# Patient Record
Sex: FEMALE | Race: WHITE | Employment: FULL TIME | URBAN - METROPOLITAN AREA
[De-identification: names, ages, dates, MRNs, and addresses within clinical notes are randomized per-mention and may not be internally consistent; named-entity substitution may affect disease eponyms.]

---

## 2017-01-25 PROBLEM — E04.1 NODULAR THYROID DISEASE: Status: ACTIVE | Noted: 2017-01-25

## 2017-03-02 PROBLEM — E03.9 ACQUIRED HYPOTHYROIDISM: Status: ACTIVE | Noted: 2017-03-02

## 2017-03-15 ENCOUNTER — HOSPITAL ENCOUNTER (OUTPATIENT)
Dept: ULTRASOUND IMAGING | Facility: HOSPITAL | Age: 47
Discharge: HOME | End: 2017-03-15
Attending: INTERNAL MEDICINE
Payer: COMMERCIAL

## 2017-03-15 DIAGNOSIS — E03.9 ACQUIRED HYPOTHYROIDISM: ICD-10-CM

## 2017-03-15 PROCEDURE — 76536 US EXAM OF HEAD AND NECK: CPT | Performed by: RADIOLOGY

## 2017-03-16 NOTE — PROGRESS NOTES
Quick Note:    Patient informed of Dr. Holly Solis result note.  Patient verbalized understanding.     ______

## 2017-03-16 NOTE — PROGRESS NOTES
Quick Note:    [address] regarding Dr. Nick Lobo result note.  Hours and number given.     ______

## 2017-08-01 ENCOUNTER — HOSPITAL ENCOUNTER (OUTPATIENT)
Dept: MAMMOGRAPHY | Facility: HOSPITAL | Age: 47
Discharge: HOME | End: 2017-08-01
Attending: OBSTETRICS & GYNECOLOGY
Payer: COMMERCIAL

## 2017-08-01 DIAGNOSIS — Z12.31 VISIT FOR SCREENING MAMMOGRAM: ICD-10-CM

## 2017-08-01 PROCEDURE — 77067 SCR MAMMO BI INCL CAD: CPT | Performed by: OBSTETRICS & GYNECOLOGY

## 2017-08-01 PROCEDURE — 77063 BREAST TOMOSYNTHESIS BI: CPT | Performed by: OBSTETRICS & GYNECOLOGY

## 2017-11-13 ENCOUNTER — OFFICE VISIT (OUTPATIENT)
Dept: FAMILY MEDICINE CLINIC | Facility: CLINIC | Age: 47
End: 2017-11-13

## 2017-11-13 VITALS
OXYGEN SATURATION: 98 % | BODY MASS INDEX: 20.89 KG/M2 | DIASTOLIC BLOOD PRESSURE: 64 MMHG | HEART RATE: 82 BPM | HEIGHT: 66 IN | SYSTOLIC BLOOD PRESSURE: 112 MMHG | TEMPERATURE: 99 F | RESPIRATION RATE: 18 BRPM | WEIGHT: 130 LBS

## 2017-11-13 DIAGNOSIS — J45.31 MILD PERSISTENT ASTHMA WITH EXACERBATION: ICD-10-CM

## 2017-11-13 DIAGNOSIS — Z01.419 WELL WOMAN EXAM: Primary | ICD-10-CM

## 2017-11-13 PROCEDURE — 99396 PREV VISIT EST AGE 40-64: CPT | Performed by: INTERNAL MEDICINE

## 2017-11-13 RX ORDER — PREDNISONE 20 MG/1
TABLET ORAL
Qty: 8 TABLET | Refills: 0 | Status: SHIPPED | OUTPATIENT
Start: 2017-11-13 | End: 2018-02-01

## 2017-11-13 RX ORDER — ALBUTEROL SULFATE 2.5 MG/3ML
2.5 SOLUTION RESPIRATORY (INHALATION) EVERY 4 HOURS PRN
Qty: 1 BOX | Refills: 1 | Status: SHIPPED | OUTPATIENT
Start: 2017-11-13

## 2017-11-13 NOTE — PROGRESS NOTES
HPI:   Mercedez Lui is a 50year old female who presents for an annual physical. Symptoms: denies discharge, itching, burning or dysuria, periods are regular, regular, sometimes skips months. No LMP recorded.   Previous pap: Julianna, Amelia 51 Past Surgical History:  2007:   2003, 10/2001: COLONOSCOPY  2/4/15: COLONOSCOPY  1991: ORAL SURGERY PROCEDURE   Family History   Problem Relation Age of Onset   • Breast Cancer Mother 70   • Gastro-Intestinal Disorder Mother      ulcers nourished,in no apparent distress  SKIN: no rashes,no suspicious lesions  HEENT: atraumatic, normocephalic; PERRLA, conjunctiva clear; ears, nose and throat are clear  NECK: supple,no adenopathy,no thyromegaly   LUNGS: clear to auscultation, easy breathing

## 2017-11-13 NOTE — PATIENT INSTRUCTIONS
LAB HOURS & LOCATIONS        Gabriela  [facility])    [address] [facility]   [address] [place] [address]     (Building [address])         [address] [place] [address] Ave    Mon-Fri  5am-8pm     Mon-Fri   7am-4pm  Sat         6am-3pm     Sat          7am-3pm      Trenton Barger

## 2018-01-24 ENCOUNTER — TELEPHONE (OUTPATIENT)
Dept: FAMILY MEDICINE CLINIC | Facility: CLINIC | Age: 48
End: 2018-01-24

## 2018-01-24 NOTE — TELEPHONE ENCOUNTER
LMOM for pt that [address] mailed a letter dated 1-8-18 with lab results, review, recommendations. Advised pt we welcome a call back to read the letter to her and go over review and recommendations and letter would be mailed again to pt.  Advised can take up to 10

## 2018-01-31 ENCOUNTER — TELEPHONE (OUTPATIENT)
Dept: FAMILY MEDICINE CLINIC | Facility: CLINIC | Age: 48
End: 2018-01-31

## 2018-01-31 NOTE — TELEPHONE ENCOUNTER
Spoke to patient. Symptoms of stomach ache, cramping, on/off pain, chills, loss of appetite. Denies fever, diarrhea, vomiting. Appointment made.

## 2018-01-31 NOTE — TELEPHONE ENCOUNTER
Pt called to request a call back from the nurse, pt is currently having some stomach issues that she wants to discuss prior to making an appt. Please call pt and advise.

## 2018-02-01 ENCOUNTER — OFFICE VISIT (OUTPATIENT)
Dept: FAMILY MEDICINE CLINIC | Facility: CLINIC | Age: 48
End: 2018-02-01

## 2018-02-01 VITALS
BODY MASS INDEX: 20.09 KG/M2 | RESPIRATION RATE: 20 BRPM | OXYGEN SATURATION: 98 % | WEIGHT: 125 LBS | SYSTOLIC BLOOD PRESSURE: 106 MMHG | HEART RATE: 72 BPM | HEIGHT: 66 IN | TEMPERATURE: 98 F | DIASTOLIC BLOOD PRESSURE: 72 MMHG

## 2018-02-01 DIAGNOSIS — R10.84 GENERALIZED ABDOMINAL PAIN: Primary | ICD-10-CM

## 2018-02-01 PROCEDURE — 99214 OFFICE O/P EST MOD 30 MIN: CPT | Performed by: INTERNAL MEDICINE

## 2018-02-03 NOTE — PROGRESS NOTES
Avril Corona is a 50year old female who presents with abdominal pain. Pain is located at Generalized. Pain is described as cramping. Severity is moderate. Associated symptoms: runny stools, emesis 1 day. The pain radiates to no where.   Has • Lipids Father      hyperlipidemia   • Prostate Cancer Paternal Grandfather    • Dementia Paternal Grandfather    • Thyroid disease Paternal Grandmother    • Diabetes Maternal Grandmother      Type 2   • Hypertension Maternal Grandmother    • Alcohol an verbalized understanding of care.

## 2018-07-03 PROCEDURE — 88175 CYTOPATH C/V AUTO FLUID REDO: CPT | Performed by: OBSTETRICS & GYNECOLOGY

## 2018-07-03 PROCEDURE — 87624 HPV HI-RISK TYP POOLED RSLT: CPT | Performed by: OBSTETRICS & GYNECOLOGY

## 2018-09-26 ENCOUNTER — HOSPITAL ENCOUNTER (OUTPATIENT)
Dept: MAMMOGRAPHY | Facility: HOSPITAL | Age: 48
Discharge: HOME | End: 2018-09-26
Attending: OBSTETRICS & GYNECOLOGY
Payer: COMMERCIAL

## 2018-09-26 DIAGNOSIS — Z01.419 ENCOUNTER FOR GYNECOLOGICAL EXAMINATION WITHOUT ABNORMAL FINDING: ICD-10-CM

## 2018-09-26 PROCEDURE — 77063 BREAST TOMOSYNTHESIS BI: CPT | Performed by: OBSTETRICS & GYNECOLOGY

## 2018-09-26 PROCEDURE — 77067 SCR MAMMO BI INCL CAD: CPT | Performed by: OBSTETRICS & GYNECOLOGY

## 2018-10-09 ENCOUNTER — HOSPITAL ENCOUNTER (OUTPATIENT)
Dept: MAMMOGRAPHY | Facility: HOSPITAL | Age: 48
Discharge: HOME | End: 2018-10-09
Attending: OBSTETRICS & GYNECOLOGY
Payer: COMMERCIAL

## 2018-10-09 DIAGNOSIS — R92.2 INCONCLUSIVE MAMMOGRAM: ICD-10-CM

## 2018-10-09 PROCEDURE — 77065 DX MAMMO INCL CAD UNI: CPT | Performed by: OBSTETRICS & GYNECOLOGY

## 2018-10-09 PROCEDURE — 77061 BREAST TOMOSYNTHESIS UNI: CPT | Performed by: OBSTETRICS & GYNECOLOGY

## 2018-10-09 NOTE — IMAGING NOTE
This Breast Care RN assisted Dr. Amelie Ozuna with recommendation for a left stereotactic biopsy. Procedure reviewed and all questions answered. Emotional and educational support given.   Pt instructed to stop all blood thinners for 3 days before biopsy and 2 da

## 2018-10-10 ENCOUNTER — OFFICE VISIT (OUTPATIENT)
Dept: SURGERY | Facility: CLINIC | Age: 48
End: 2018-10-10
Payer: COMMERCIAL

## 2018-10-10 VITALS
TEMPERATURE: 97 F | HEART RATE: 87 BPM | SYSTOLIC BLOOD PRESSURE: 102 MMHG | BODY MASS INDEX: 19.77 KG/M2 | WEIGHT: 123 LBS | DIASTOLIC BLOOD PRESSURE: 63 MMHG | HEIGHT: 66 IN

## 2018-10-10 DIAGNOSIS — R92.0 MICROCALCIFICATION OF LEFT BREAST ON MAMMOGRAM: Primary | ICD-10-CM

## 2018-10-10 PROCEDURE — 99243 OFF/OP CNSLTJ NEW/EST LOW 30: CPT | Performed by: SURGERY

## 2018-10-10 NOTE — H&P
New Patient Visit Note       Active Problems      1.  Microcalcification of left breast on mammogram        Chief Complaint   Patient presents with:  Abnormal Mammogram: Rec. stereotactic biopsy, never had a biopsy, mother had lumpectomy in 2011 with radiat Relation Age of Onset   • Breast Cancer Mother 70   • Gastro-Intestinal Disorder Mother         ulcers   • Breast Cancer Paternal Aunt 37        estimate   • Prostate Cancer Father    • Hypertension Father    • Lipids Father         hyperlipidemia   • Pros lungs 2 (two) times daily. Disp:  Rfl:    Levonorgestrel-Ethinyl Estrad (ORSYTHIA) 0.1-20 MG-MCG Oral Tab Take 1 tablet by mouth once daily.  Disp: 3 Package Rfl: 3   VENTOLIN  (90 Base) MCG/ACT Inhalation Aero Soln Inhale 2 puffs into the lungs ever well-nourished. No distress. HENT:   Head: Normocephalic and atraumatic. Eyes: Conjunctivae and EOM are normal. Pupils are equal, round, and reactive to light. No scleral icterus. Neck: Trachea normal and full passive range of motion without pain.  Ne Magnification views left breast calcifications demonstrates an indeterminate cluster of calcifications at the 8 o'clock position left breast.  These are amenable to stereotactic biopsy.   Biopsy recommendation was discussed with the patient and   Dr. George Gonzalez

## 2018-10-11 NOTE — PROGRESS NOTES
Future Appointments  10/19/2018 1:00 PM    [facility] BREAST BX           [address]   10/23/2018 8:45 AM    BREAST CLINIC              [address] [facility]  7/8/2019   1:10 PM    Tomasz Sanchez MD      [id number]         [facility]

## 2018-10-18 ENCOUNTER — TELEPHONE (OUTPATIENT)
Dept: SURGERY | Facility: CLINIC | Age: 48
End: 2018-10-18

## 2018-10-18 NOTE — PAT NURSING NOTE
Patient had called PAT earlier today wanting information regarding the stereotactic breast biopsy tomorrow. Procedure will be performed at the [facility] and not in the [facility] [address].   I spoke with Nya Shin in the imaging center and was informed that frnakie

## 2018-10-19 ENCOUNTER — HOSPITAL ENCOUNTER (OUTPATIENT)
Dept: MAMMOGRAPHY | Facility: HOSPITAL | Age: 48
Discharge: HOME | End: 2018-10-19
Attending: SURGERY
Payer: COMMERCIAL

## 2018-10-19 DIAGNOSIS — R92.1 BREAST CALCIFICATIONS: ICD-10-CM

## 2018-10-19 PROCEDURE — 19081 BX BREAST 1ST LESION STRTCTC: CPT | Performed by: SURGERY

## 2018-10-19 PROCEDURE — 88305 TISSUE EXAM BY PATHOLOGIST: CPT | Performed by: SURGERY

## 2018-10-19 NOTE — OPERATIVE REPORT
[facility]    PATIENT'S NAME: Lynne Feliz   ATTENDING PHYSICIAN: Kirsten Montano M.D. OPERATING PHYSICIAN: Kirsten Montano M.D.    PATIENT ACCOUNT#:   [de-identified]    LOCATION:  [facility]  MEDICAL RECORD #:   [id number]       DATE Beryl Kincaid withdrawn. Pressure and dressings were applied per the mammography staff. The patient tolerated the procedure well and was discharged in good condition.     Dictated By Scooby Harris M.D.  d: 10/19/2018 17:11:02  t: 10/19/2018 18:21:21  Job [id number]

## 2018-10-23 ENCOUNTER — OFFICE VISIT (OUTPATIENT)
Dept: SURGERY | Facility: CLINIC | Age: 48
End: 2018-10-23

## 2018-10-23 ENCOUNTER — OFFICE VISIT (OUTPATIENT)
Dept: HEMATOLOGY/ONCOLOGY | Facility: HOSPITAL | Age: 48
End: 2018-10-23
Attending: SURGERY
Payer: COMMERCIAL

## 2018-10-23 VITALS
BODY MASS INDEX: 20.17 KG/M2 | DIASTOLIC BLOOD PRESSURE: 55 MMHG | SYSTOLIC BLOOD PRESSURE: 103 MMHG | RESPIRATION RATE: 18 BRPM | WEIGHT: 125.5 LBS | OXYGEN SATURATION: 97 % | HEIGHT: 65.98 IN | HEART RATE: 77 BPM | TEMPERATURE: 98 F

## 2018-10-23 DIAGNOSIS — N60.12 FIBROCYSTIC CHANGES OF LEFT BREAST: Primary | ICD-10-CM

## 2018-10-23 DIAGNOSIS — N60.12 FIBROCYSTIC CHANGES OF LEFT BREAST: ICD-10-CM

## 2018-10-23 PROCEDURE — 99211 OFF/OP EST MAY X REQ PHY/QHP: CPT

## 2018-10-23 PROCEDURE — 99212 OFFICE O/P EST SF 10 MIN: CPT | Performed by: SURGERY

## 2018-10-23 RX ORDER — BUDESONIDE AND FORMOTEROL FUMARATE DIHYDRATE 160; 4.5 UG/1; UG/1
2 AEROSOL RESPIRATORY (INHALATION) 2 TIMES DAILY
COMMUNITY

## 2018-10-23 NOTE — PROGRESS NOTES
HPI:    Patient ID: Mirza Cash is a 46year old female who follows up after left breast stereotactic biopsy, performed October 19, 2018. She has no breast complaints. The biopsy went quite well.     HPI    Review of Systems           Current Pulmonary/Chest: She exhibits no mass. Left breast exhibits no inverted nipple, no mass, no nipple discharge, no skin change and no tenderness. Left breast biopsy site healing well   Neurological: She is alert and oriented to person, place, and time.

## 2018-10-23 NOTE — PROGRESS NOTES
Patient is here today for follow up  with Dr. Adelaida Topete / Rajwinder Mir. Patient Denies pain. Medication list and medical history were reviewed and updated.     Education Record    Learner:  Patient    Disease / Diagnosis: Breast Clinic / Dr. Gabino Mendes

## 2018-10-29 PROBLEM — N60.12 FIBROCYSTIC CHANGES OF LEFT BREAST: Status: ACTIVE | Noted: 2018-10-29

## 2019-09-16 ENCOUNTER — OFFICE VISIT (OUTPATIENT)
Dept: SURGERY | Facility: CLINIC | Age: 49
End: 2019-09-16
Payer: COMMERCIAL

## 2019-09-16 VITALS
HEART RATE: 91 BPM | BODY MASS INDEX: 20 KG/M2 | SYSTOLIC BLOOD PRESSURE: 115 MMHG | WEIGHT: 125 LBS | TEMPERATURE: 99 F | DIASTOLIC BLOOD PRESSURE: 62 MMHG

## 2019-09-16 DIAGNOSIS — K92.2 INTESTINAL BLEEDING: ICD-10-CM

## 2019-09-16 DIAGNOSIS — Z12.11 ENCOUNTER FOR SCREENING COLONOSCOPY: Primary | ICD-10-CM

## 2019-09-16 PROCEDURE — 99213 OFFICE O/P EST LOW 20 MIN: CPT | Performed by: SURGERY

## 2019-09-16 RX ORDER — LEVOTHYROXINE SODIUM 0.12 MG/1
125 TABLET ORAL
Refills: 2 | COMMUNITY
Start: 2019-06-23

## 2019-09-16 RX ORDER — POLYETHYLENE GLYCOL 3350, SODIUM CHLORIDE, SODIUM BICARBONATE, POTASSIUM CHLORIDE 420; 11.2; 5.72; 1.48 G/4L; G/4L; G/4L; G/4L
POWDER, FOR SOLUTION ORAL
Qty: 1 BOTTLE | Refills: 0 | Status: SHIPPED | OUTPATIENT
Start: 2019-09-16

## 2019-09-16 NOTE — H&P
New Patient Visit Note       Active Problems      1. Encounter for screening colonoscopy    2.  Intestinal bleeding        Chief Complaint   Patient presents with:  Colonoscopy: NP no family hx of colon CA      History of Present Illness   This patient pres Procedure Laterality Date   •   2007   • COLONOSCOPY  2003, 10/2001   • COLONOSCOPY  2/4/15   • COLONOSCOPY, POSSIBLE BIOPSY, POSSIBLE POLYPECTOMY [address]681 N/A 10/11/2019    Performed by Aracelis Chambers MD at [place] [address] Breath Activated, Inhale 1 puff into the lungs 2 (two) times daily. , Disp: , Rfl:   •  VENTOLIN  (90 Base) MCG/ACT Inhalation Aero Soln, Inhale 2 puffs into the lungs every 4 (four) hours as needed for Wheezing., Disp: 1 Inhaler, Rfl: 1  •  albutero kg)   BMI 20.19 kg/m²   Physical Exam   Constitutional: She is oriented to person, place, and time. She appears well-developed and well-nourished. No distress. HENT:   Head: Normocephalic and atraumatic.    Eyes: Conjunctivae and EOM are normal. No sclera

## 2019-10-25 ENCOUNTER — PATIENT OUTREACH (OUTPATIENT)
Dept: SURGERY | Facility: CLINIC | Age: 49
End: 2019-10-25

## 2020-07-01 ENCOUNTER — TELEPHONE (OUTPATIENT)
Dept: SURGERY | Facility: CLINIC | Age: 50
End: 2020-07-01

## 2020-07-01 NOTE — TELEPHONE ENCOUNTER
Called pt to notify Suly reviewed KWABENA from [address] and came back normal. Suly ask she have a breast exam in office or if another physician does them, that if fine as well. Pt states she has an OBGYN appt coming up and will have it completed then.

## 2020-07-08 ENCOUNTER — MED REC SCAN ONLY (OUTPATIENT)
Dept: SURGERY | Facility: CLINIC | Age: 50
End: 2020-07-08

## 2021-04-27 ENCOUNTER — OFFICE VISIT (OUTPATIENT)
Dept: FAMILY MEDICINE CLINIC | Facility: CLINIC | Age: 51
End: 2021-04-27
Payer: COMMERCIAL

## 2021-04-27 VITALS
HEART RATE: 86 BPM | BODY MASS INDEX: 20.25 KG/M2 | TEMPERATURE: 97 F | SYSTOLIC BLOOD PRESSURE: 98 MMHG | WEIGHT: 126 LBS | RESPIRATION RATE: 18 BRPM | DIASTOLIC BLOOD PRESSURE: 62 MMHG | HEIGHT: 66 IN

## 2021-04-27 DIAGNOSIS — E06.3 HYPOTHYROIDISM DUE TO HASHIMOTO'S THYROIDITIS: ICD-10-CM

## 2021-04-27 DIAGNOSIS — Z00.00 ROUTINE GENERAL MEDICAL EXAMINATION AT A HEALTH CARE FACILITY: Primary | ICD-10-CM

## 2021-04-27 DIAGNOSIS — E03.8 HYPOTHYROIDISM DUE TO HASHIMOTO'S THYROIDITIS: ICD-10-CM

## 2021-04-27 DIAGNOSIS — J45.40 MODERATE PERSISTENT ASTHMA WITHOUT COMPLICATION: ICD-10-CM

## 2021-04-27 PROBLEM — E04.1 NODULAR THYROID DISEASE: Status: RESOLVED | Noted: 2017-01-25 | Resolved: 2021-04-27

## 2021-04-27 PROBLEM — E03.9 ACQUIRED HYPOTHYROIDISM: Status: RESOLVED | Noted: 2017-03-02 | Resolved: 2021-04-27

## 2021-04-27 PROBLEM — K92.2 INTESTINAL BLEEDING: Status: RESOLVED | Noted: 2019-09-16 | Resolved: 2021-04-27

## 2021-04-27 PROCEDURE — 3078F DIAST BP <80 MM HG: CPT | Performed by: FAMILY MEDICINE

## 2021-04-27 PROCEDURE — 99386 PREV VISIT NEW AGE 40-64: CPT | Performed by: FAMILY MEDICINE

## 2021-04-27 PROCEDURE — 3074F SYST BP LT 130 MM HG: CPT | Performed by: FAMILY MEDICINE

## 2021-04-27 PROCEDURE — 3008F BODY MASS INDEX DOCD: CPT | Performed by: FAMILY MEDICINE

## 2021-04-27 NOTE — PROGRESS NOTES
HPI:   Kiera Feliz is a 44year old female who presents for a complete physical exam.  Last pap:  4/2021 - sees Dr. Olayinka Ackerman  Last mammogram: 7/2020    On OCPs per GYN  Previous colonoscopy:  10/2019 - repeat in 10 years  Previous DEXA:  /.  Bhavani Anderson Aerosol Inhale 2 puffs into the lungs 2 (two) times daily. • fluticasone-salmeterol 100-50 MCG/DOSE Inhalation Aerosol Powder, Breath Activated Inhale 1 puff into the lungs 2 (two) times daily.      • VENTOLIN  (90 Base) MCG/ACT Inhalation Aero S Grandmother         Type 2   • Hypertension Maternal Grandmother    • Alcohol and Other Disorders Associated Maternal Grandfather         alcohol abuse   • Cancer Maternal Aunt         cervical      Social History:   Social History    Tobacco Use      Smok Visit:  Requested Prescriptions      No prescriptions requested or ordered in this encounter       Imaging & Consults:  PULMONARY - INTERNAL

## 2021-09-01 LAB
ALBUMIN/GLOBULIN RATIO: 1.4 (CALC) (ref 1–2.5)
ALBUMIN: 4 G/DL (ref 3.6–5.1)
ALKALINE PHOSPHATASE: 29 U/L (ref 37–153)
ALT: 13 U/L (ref 6–29)
AST: 13 U/L (ref 10–35)
BILIRUBIN, TOTAL: 1.5 MG/DL (ref 0.2–1.2)
BUN: 10 MG/DL (ref 7–25)
CALCIUM: 9.3 MG/DL (ref 8.6–10.4)
CARBON DIOXIDE: 26 MMOL/L (ref 20–32)
CHLORIDE: 103 MMOL/L (ref 98–110)
CHOL/HDLC RATIO: 4 (CALC)
CHOLESTEROL, TOTAL: 211 MG/DL
CREATININE: 0.58 MG/DL (ref 0.5–1.05)
EGFR IF AFRICN AM: 123 ML/MIN/1.73M2
EGFR IF NONAFRICN AM: 106 ML/MIN/1.73M2
GLOBULIN: 2.9 G/DL (CALC) (ref 1.9–3.7)
GLUCOSE: 81 MG/DL (ref 65–99)
HDL CHOLESTEROL: 53 MG/DL
HEMATOCRIT: 36.1 % (ref 35–45)
HEMOGLOBIN: 12.2 G/DL (ref 11.7–15.5)
LDL-CHOLESTEROL: 143 MG/DL (CALC)
MCH: 30 PG (ref 27–33)
MCHC: 33.8 G/DL (ref 32–36)
MCV: 88.9 FL (ref 80–100)
MPV: 10.4 FL (ref 7.5–12.5)
NON-HDL CHOLESTEROL: 158 MG/DL (CALC)
PLATELET COUNT: 269 THOUSAND/UL (ref 140–400)
POTASSIUM: 4.4 MMOL/L (ref 3.5–5.3)
PROTEIN, TOTAL: 6.9 G/DL (ref 6.1–8.1)
RDW: 11.8 % (ref 11–15)
RED BLOOD CELL COUNT: 4.06 MILLION/UL (ref 3.8–5.1)
SODIUM: 137 MMOL/L (ref 135–146)
T4, FREE: 1.4 NG/DL (ref 0.8–1.8)
TRIGLYCERIDES: 60 MG/DL
TSH: 1.21 MIU/L
VITAMIN D, 25-OH, TOTAL: 27 NG/ML (ref 30–100)
WHITE BLOOD CELL COUNT: 4.7 THOUSAND/UL (ref 3.8–10.8)

## 2024-12-11 ENCOUNTER — LAB ENCOUNTER (OUTPATIENT)
Dept: LAB | Facility: HOSPITAL | Age: 54
End: 2024-12-11
Attending: STUDENT IN AN ORGANIZED HEALTH CARE EDUCATION/TRAINING PROGRAM
Payer: COMMERCIAL

## 2024-12-11 DIAGNOSIS — Z01.818 PRE-OP TESTING: ICD-10-CM

## 2024-12-11 LAB
ERYTHROCYTE [DISTWIDTH] IN BLOOD BY AUTOMATED COUNT: 11.6 %
HCT VFR BLD AUTO: 38.2 %
HGB BLD-MCNC: 12.7 G/DL
MCH RBC QN AUTO: 29.8 PG (ref 26–34)
MCHC RBC AUTO-ENTMCNC: 33.2 G/DL (ref 31–37)
MCV RBC AUTO: 89.7 FL
PLATELET # BLD AUTO: 251 10(3)UL (ref 150–450)
RBC # BLD AUTO: 4.26 X10(6)UL
WBC # BLD AUTO: 5.4 X10(3) UL (ref 4–11)

## 2024-12-11 PROCEDURE — 85027 COMPLETE CBC AUTOMATED: CPT

## 2024-12-11 PROCEDURE — 36415 COLL VENOUS BLD VENIPUNCTURE: CPT

## 2024-12-12 ENCOUNTER — ANESTHESIA EVENT (OUTPATIENT)
Dept: SURGERY | Facility: HOSPITAL | Age: 54
End: 2024-12-12
Payer: COMMERCIAL

## 2024-12-13 ENCOUNTER — HOSPITAL ENCOUNTER (OUTPATIENT)
Facility: HOSPITAL | Age: 54
Setting detail: HOSPITAL OUTPATIENT SURGERY
Discharge: HOME | End: 2024-12-13
Attending: STUDENT IN AN ORGANIZED HEALTH CARE EDUCATION/TRAINING PROGRAM | Admitting: STUDENT IN AN ORGANIZED HEALTH CARE EDUCATION/TRAINING PROGRAM
Payer: COMMERCIAL

## 2024-12-13 ENCOUNTER — ANESTHESIA (OUTPATIENT)
Dept: SURGERY | Facility: HOSPITAL | Age: 54
End: 2024-12-13
Payer: COMMERCIAL

## 2024-12-13 VITALS
HEART RATE: 73 BPM | WEIGHT: 126.63 LBS | TEMPERATURE: 98 F | DIASTOLIC BLOOD PRESSURE: 56 MMHG | OXYGEN SATURATION: 99 % | SYSTOLIC BLOOD PRESSURE: 96 MMHG | RESPIRATION RATE: 16 BRPM | BODY MASS INDEX: 20.35 KG/M2 | HEIGHT: 66 IN

## 2024-12-13 DIAGNOSIS — Z01.818 PRE-OP TESTING: Primary | ICD-10-CM

## 2024-12-13 PROCEDURE — 0UB98ZZ EXCISION OF UTERUS, VIA NATURAL OR ARTIFICIAL OPENING ENDOSCOPIC: ICD-10-PCS | Performed by: STUDENT IN AN ORGANIZED HEALTH CARE EDUCATION/TRAINING PROGRAM

## 2024-12-13 PROCEDURE — 88305 TISSUE EXAM BY PATHOLOGIST: CPT | Performed by: STUDENT IN AN ORGANIZED HEALTH CARE EDUCATION/TRAINING PROGRAM

## 2024-12-13 RX ORDER — ONDANSETRON 2 MG/ML
4 INJECTION INTRAMUSCULAR; INTRAVENOUS EVERY 6 HOURS PRN
Status: DISCONTINUED | OUTPATIENT
Start: 2024-12-13 | End: 2024-12-13

## 2024-12-13 RX ORDER — IBUPROFEN 600 MG/1
600 TABLET, FILM COATED ORAL EVERY 6 HOURS PRN
Qty: 20 TABLET | Refills: 0 | Status: SHIPPED | OUTPATIENT
Start: 2024-12-13

## 2024-12-13 RX ORDER — SODIUM CHLORIDE, SODIUM LACTATE, POTASSIUM CHLORIDE, CALCIUM CHLORIDE 600; 310; 30; 20 MG/100ML; MG/100ML; MG/100ML; MG/100ML
INJECTION, SOLUTION INTRAVENOUS CONTINUOUS
Status: DISCONTINUED | OUTPATIENT
Start: 2024-12-13 | End: 2024-12-13

## 2024-12-13 RX ORDER — SCOLOPAMINE TRANSDERMAL SYSTEM 1 MG/1
1 PATCH, EXTENDED RELEASE TRANSDERMAL ONCE
Status: DISCONTINUED | OUTPATIENT
Start: 2024-12-13 | End: 2024-12-13 | Stop reason: HOSPADM

## 2024-12-13 RX ORDER — DEXAMETHASONE SODIUM PHOSPHATE 4 MG/ML
VIAL (ML) INJECTION AS NEEDED
Status: DISCONTINUED | OUTPATIENT
Start: 2024-12-13 | End: 2024-12-13 | Stop reason: SURG

## 2024-12-13 RX ORDER — MIDAZOLAM HYDROCHLORIDE 1 MG/ML
INJECTION INTRAMUSCULAR; INTRAVENOUS AS NEEDED
Status: DISCONTINUED | OUTPATIENT
Start: 2024-12-13 | End: 2024-12-13 | Stop reason: SURG

## 2024-12-13 RX ORDER — NALOXONE HYDROCHLORIDE 0.4 MG/ML
0.08 INJECTION, SOLUTION INTRAMUSCULAR; INTRAVENOUS; SUBCUTANEOUS AS NEEDED
Status: DISCONTINUED | OUTPATIENT
Start: 2024-12-13 | End: 2024-12-13

## 2024-12-13 RX ORDER — HYDROMORPHONE HYDROCHLORIDE 1 MG/ML
0.4 INJECTION, SOLUTION INTRAMUSCULAR; INTRAVENOUS; SUBCUTANEOUS EVERY 5 MIN PRN
Status: DISCONTINUED | OUTPATIENT
Start: 2024-12-13 | End: 2024-12-13

## 2024-12-13 RX ORDER — HYDROCODONE BITARTRATE AND ACETAMINOPHEN 5; 325 MG/1; MG/1
1 TABLET ORAL ONCE AS NEEDED
Status: DISCONTINUED | OUTPATIENT
Start: 2024-12-13 | End: 2024-12-13

## 2024-12-13 RX ORDER — ONDANSETRON 2 MG/ML
INJECTION INTRAMUSCULAR; INTRAVENOUS AS NEEDED
Status: DISCONTINUED | OUTPATIENT
Start: 2024-12-13 | End: 2024-12-13 | Stop reason: SURG

## 2024-12-13 RX ORDER — HYDROMORPHONE HYDROCHLORIDE 1 MG/ML
0.6 INJECTION, SOLUTION INTRAMUSCULAR; INTRAVENOUS; SUBCUTANEOUS EVERY 5 MIN PRN
Status: DISCONTINUED | OUTPATIENT
Start: 2024-12-13 | End: 2024-12-13

## 2024-12-13 RX ORDER — DIPHENHYDRAMINE HYDROCHLORIDE 50 MG/ML
12.5 INJECTION INTRAMUSCULAR; INTRAVENOUS AS NEEDED
Status: DISCONTINUED | OUTPATIENT
Start: 2024-12-13 | End: 2024-12-13

## 2024-12-13 RX ORDER — KETOROLAC TROMETHAMINE 30 MG/ML
INJECTION, SOLUTION INTRAMUSCULAR; INTRAVENOUS AS NEEDED
Status: DISCONTINUED | OUTPATIENT
Start: 2024-12-13 | End: 2024-12-13 | Stop reason: SURG

## 2024-12-13 RX ORDER — LIDOCAINE HYDROCHLORIDE 10 MG/ML
INJECTION, SOLUTION EPIDURAL; INFILTRATION; INTRACAUDAL; PERINEURAL AS NEEDED
Status: DISCONTINUED | OUTPATIENT
Start: 2024-12-13 | End: 2024-12-13 | Stop reason: SURG

## 2024-12-13 RX ORDER — HYDROCODONE BITARTRATE AND ACETAMINOPHEN 5; 325 MG/1; MG/1
2 TABLET ORAL ONCE AS NEEDED
Status: DISCONTINUED | OUTPATIENT
Start: 2024-12-13 | End: 2024-12-13

## 2024-12-13 RX ORDER — PROCHLORPERAZINE EDISYLATE 5 MG/ML
5 INJECTION INTRAMUSCULAR; INTRAVENOUS EVERY 8 HOURS PRN
Status: DISCONTINUED | OUTPATIENT
Start: 2024-12-13 | End: 2024-12-13

## 2024-12-13 RX ORDER — ACETAMINOPHEN 500 MG
1000 TABLET ORAL ONCE AS NEEDED
Status: DISCONTINUED | OUTPATIENT
Start: 2024-12-13 | End: 2024-12-13

## 2024-12-13 RX ORDER — ACETAMINOPHEN 500 MG
1000 TABLET ORAL ONCE
Status: DISCONTINUED | OUTPATIENT
Start: 2024-12-13 | End: 2024-12-13 | Stop reason: HOSPADM

## 2024-12-13 RX ORDER — HYDROMORPHONE HYDROCHLORIDE 1 MG/ML
0.2 INJECTION, SOLUTION INTRAMUSCULAR; INTRAVENOUS; SUBCUTANEOUS EVERY 5 MIN PRN
Status: DISCONTINUED | OUTPATIENT
Start: 2024-12-13 | End: 2024-12-13

## 2024-12-13 RX ADMIN — LIDOCAINE HYDROCHLORIDE 50 MG: 10 INJECTION, SOLUTION EPIDURAL; INFILTRATION; INTRACAUDAL; PERINEURAL at 07:39:00

## 2024-12-13 RX ADMIN — MIDAZOLAM HYDROCHLORIDE 2 MG: 1 INJECTION INTRAMUSCULAR; INTRAVENOUS at 07:38:00

## 2024-12-13 RX ADMIN — ONDANSETRON 4 MG: 2 INJECTION INTRAMUSCULAR; INTRAVENOUS at 07:47:00

## 2024-12-13 RX ADMIN — DEXAMETHASONE SODIUM PHOSPHATE 4 MG: 4 MG/ML VIAL (ML) INJECTION at 07:47:00

## 2024-12-13 RX ADMIN — KETOROLAC TROMETHAMINE 30 MG: 30 INJECTION, SOLUTION INTRAMUSCULAR; INTRAVENOUS at 07:55:00

## 2024-12-13 RX ADMIN — SODIUM CHLORIDE, SODIUM LACTATE, POTASSIUM CHLORIDE, CALCIUM CHLORIDE: 600; 310; 30; 20 INJECTION, SOLUTION INTRAVENOUS at 07:38:00

## 2024-12-13 NOTE — OPERATIVE REPORT
[facility]    Vane Oliveira Patient Status:  Hospital Outpatient Surgery    1970 MRN [id number]   Location [facility] SURGERY Attending Lynn Mckeon MD   Hosp Day # 0 PCP SHERLEY Iyer     Date of procedure: 2024    Preoperative diagnosis:  Postmenopausal bleeding  Suspected submucosal fibroid    Postoperative diagnosis:  same    Procedure:  Hysteroscopic myomectomy with Truclear, and D&C    Surgeon: Dr.Darlene Mckeon    Anesthesia: MAC  EBL: 5 cc  Urine output: 150cc  Fluids: see EMR  Antibiotics: none indicated  DVT ppx: SCDs  Fluid defecit: 150 cc    Findings:  1) submucosal fibroid with otherwise hysteroscopically normal atrophic endometrium    Complications: none    Specimens: fibroid and endometrial curettings    Indication for procedure: This is a 55 yo with postmenopausal bleeding found to have a suspected submucosal fibroid on ultrasound. The risks were reviewed with her, and she gave informed consent.    Procedure: The patient was taken to the operating room and placed under anesthesia. She was prepped and draped in sterile fashion in lithotomy position. Her bladder was drained. A speculum was placed into the vagina, and her cervix was grasped with a single tooth tenaculum. The uterus was sounded as above. The cervix was then dilated to a 7mm Hegar dilator.     The 6 mm 0 degree hysteroscope was inserted, with findings as noted above. The soft tissue mini truclear morcellator was then used to remove fibroid and sample cavity. The endometrium was thin and without lesions after morcellation. The hysteroscope was removed. Sharp currette was performed.     The tenaculum and speculum were removed. Pressure was applied to the cervix, and it was noted to be hemostatic.    She was awoken from anesthesia and taken to the recovery room in good condition. She tolerated the procedure well.

## 2024-12-13 NOTE — DISCHARGE INSTRUCTIONS
For the next two weeks:  -Nothing in the vagina (no sex, no tampons)  -take OTC ibuprofen/tylenol as needed for pain  -Call the office for vaginal bleeding more than a period or bleeding that soaks more than one pad per hour  -Call the office for fever >100.5  -Call the office for pain not relieved by pain medication

## 2024-12-13 NOTE — ANESTHESIA PREPROCEDURE EVALUATION
PRE-OP EVALUATION    Patient Name: Vane Oliveira    Admit Diagnosis: SUBMUCOSAL FIBROID    Pre-op Diagnosis: SUBMUCOSAL FIBROID    HYSTEROSCOPY MYOMECTOMY, DILATION AND CURETTAGE    Anesthesia Procedure: HYSTEROSCOPY MYOMECTOMY, DILATION AND CURETTAGE (Vagina )    Surgeons and Role:     * Lynn Mckeon MD - Primary    Pre-op vitals reviewed.  Temp: 97.3 °F (36.3 °C)  Pulse: 83  Resp: 18  BP: 115/63  SpO2: 98 %  Body mass index is 20.43 kg/m².    Current medications reviewed.  Hospital Medications:  • acetaminophen (Tylenol Extra Strength) tab 1,000 mg  1,000 mg Oral Once   • scopolamine (Transderm-Scop) 1 MG/3DAYS patch 1 patch  1 patch Transdermal Once   • lactated ringers infusion   Intravenous Continuous       Outpatient Medications:   Prescriptions Prior to Admission[1]    Allergies: Seasonal      Anesthesia Evaluation    Patient summary reviewed.    Anesthetic Complications           GI/Hepatic/Renal                                 Cardiovascular                     (+) hyperlipidemia                                  Endo/Other  Comment: Submucus fibroid for hysteroscopy D&C         (+) hypothyroidism                       Pulmonary      (+) asthma                     Neuro/Psych                                    Past Surgical History:   Procedure Laterality Date   •   2007   • Colonoscopy  2003, 10/2001   • Colonoscopy  2/4/15   • Colonoscopy N/A 10/11/2019    Procedure: COLONOSCOPY, POSSIBLE BIOPSY, POSSIBLE POLYPECTOMY 41514;  Surgeon: Agustina Murray MD;  [facility]   • D & c  2021    Hysteroscopy   • Hysteroscopy  2021    TruClear HSC, D&C   • Oral surgery procedure  1991     Social History     Socioeconomic History   • Marital status:    Tobacco Use   • Smoking status: Never   • Smokeless tobacco: Never   Vaping Use   • Vaping status: Never Used   Substance and Sexual Activity   • Alcohol use: Yes     Comment: rare   • Drug use: No    Other Topics Concern   • Caffeine Concern Yes     Comment: pop 2 a month and 1 sweet tea    • Exercise Yes     Comment: workouts at home    • Seat Belt Yes   • Self-Exams Yes     History   Drug Use No     Available pre-op labs reviewed.  Lab Results   Component Value Date    WBC 5.4 12/11/2024    RBC 4.26 12/11/2024    HGB 12.7 12/11/2024    HCT 38.2 12/11/2024    MCV 89.7 12/11/2024    MCH 29.8 12/11/2024    MCHC 33.2 12/11/2024    RDW 11.6 12/11/2024    .0 12/11/2024               Airway      Mallampati: II  Mouth opening: >3 FB  TM distance: > 6 cm  Neck ROM: full Cardiovascular    Cardiovascular exam normal.  Rhythm: regular  Rate: normal     Dental    Dentition appears grossly intact         Pulmonary    Pulmonary exam normal.  Breath sounds clear to auscultation bilaterally.               Other findings          ASA: 2   Plan: MAC  NPO status verified and patient meets guidelines.        Comment: Plan for MAC. A detailed discussion of the risks and benefits of the proposed anesthetic was had in the preoperative area, including risk of conversion to a general anesthetic, nausea/vomiting, dental damage, sore throat and allergic/ adverse reactions to medications administered. The possibility of needing to convert to general anesthesia if necessary was discussed. Questions answered and patient wishes to proceed. Consent signed.     Plan/risks discussed with: patient and spouse            Present on Admission:  **None**             [1]   Medications Prior to Admission   Medication Sig Dispense Refill Last Dose/Taking   • levothyroxine 125 MCG Oral Tab Take 1 tablet (125 mcg total) by mouth once daily. 90 tablet 1 12/13/2024 Morning   • Budesonide-Formoterol Fumarate 160-4.5 MCG/ACT Inhalation Aerosol Inhale 2 puffs into the lungs 2 (two) times daily.   12/12/2024 Noon   • fluticasone-salmeterol 100-50 MCG/DOSE Inhalation Aerosol Powder, Breath Activated Inhale 1 puff into the lungs 2 (two) times daily.    12/12/2024 Noon   • VENTOLIN  (90 Base) MCG/ACT Inhalation Aero Soln Inhale 2 puffs into the lungs every 4 (four) hours as needed for Wheezing. 1 Inhaler 1 Taking As Needed   • albuterol sulfate (2.5 MG/3ML) 0.083% Inhalation Nebu Soln Take 3 mL (2.5 mg total) by nebulization every 4 (four) hours as needed for Wheezing. 1 Box 1 12/12/2024 Noon   • Montelukast Sodium (SINGULAIR) 10 MG Oral Tab   1 12/12/2024 Evening   • Multiple Vitamins-Minerals (MULTIVITAMIN OR) Take  by mouth.   Past Week   • Ascorbic Acid (VITAMIN C OR) Take  by mouth.   Past Week   • CALCIUM-VITAMIN D OR    Past Week   • PEG 3350-KCl-Na Bicarb-NaCl (TRILYTE) 420 g Oral Recon Soln Starting at 4:00 pm the night before procedure, drink 8 ounces of the prep every 15-20 minutes until finished 1 Bottle 0

## 2024-12-13 NOTE — H&P
[facility]  History & Physical    WilmaKennedi Oliveira Patient Status:  Hospital Outpatient Surgery    1970 MRN [id number]   Location [facility] PERIOPERATIVE SERVICE Attending Lynn Mckeon MD   Hosp Day # 0 PCP Miguelina Vance, SHERLEY     SUBJECTIVE:    Reason for Admission:  Scheduled hysteroscopic myomectomy    History of Present Illness:  Patient is a(n) 54 year old female  with a history of postmenopausal bleeding here for scheduled hysteroscopy. took misoprostol for premedication. Since last visit has had some spotting       PMH - Asthma  PSH-  X1; breast biopsy  OB/GYN -   and vaginal Last pap - NILM/HPV negative History of stds - none Mammo ordered   Menopause around     Medications:  Prescriptions Prior to Admission[1]    Allergies:  Allergies[2]     Past Medical History:  Past Medical History:    Allergic rhinitis    Asthma (HCC)    Colon polyps    (first noted)    History of miscarriage    x1    Hyperlipidemia    Hypothyroid    Pneumonia    Transfusion history    \"transfusion\" per E-Clinical       Past Surgical History:  Past Surgical History:   Procedure Laterality Date      2007    Colonoscopy  2003, 10/2001    Colonoscopy  2/4/15    Colonoscopy N/A 10/11/2019    Procedure: COLONOSCOPY, POSSIBLE BIOPSY, POSSIBLE POLYPECTOMY 31369;  Surgeon: Agustina Murray MD;  Location: [facility]    D & c  2021    Hysteroscopy    Hysteroscopy  2021    [facility], [facility]&C    Oral surgery procedure  1991       Past OB History:  OB History    Para Term  AB Living   3 2 2   1 2   SAB IAB Ectopic Multiple Live Births   1       2      # Outcome Date GA Lbr Dano/2nd Weight Sex Type Anes PTL Lv   3 SAB            2 Term         NAVI   1 Term         NAVI         Social History:  Social History     Tobacco Use    Smoking status: Never    Smokeless tobacco: Never   Substance Use Topics    Alcohol use: Yes     Comment:  rare        Family History:  Family History   Problem Relation Age of Onset    Breast Cancer Mother 71    Gastro-Intestinal Disorder Mother         ulcers    Breast Cancer Paternal Aunt 43        estimate    Prostate Cancer Father     Hypertension Father     Lipids Father         hyperlipidemia    Prostate Cancer Paternal Grandfather     Dementia Paternal Grandfather     Thyroid disease Paternal Grandmother     Diabetes Maternal Grandmother         Type 2    Hypertension Maternal Grandmother     Alcohol and Other Disorders Associated Maternal Grandfather         alcohol abuse    Cancer Maternal Aunt         cervical       Review of Systems:  Non-contributory    OBJECTIVE:    Temp:  [97.3 °F (36.3 °C)] 97.3 °F (36.3 °C)  Pulse:  [83] 83  Resp:  [18] 18  BP: (115)/(63) 115/63  SpO2:  [98 %] 98 %  No intake or output data in the 24 hours ending 12/13/24 0732    Physical Exam:  General: Alert, orientated x3. .  Vital Signs:  Blood pressure 115/63, pulse 83, temperature 97.3 °F (36.3 °C), temperature source Temporal, resp. rate 18, height 5' 6\" (1.676 m), weight 126 lb 9.6 oz (57.4 kg), SpO2 98%.. VSS Afebrile  Lungs: Clear to auscultation bilaterally.  Cardiac: Regular rate   Abdomen:  Soft, non-distended, non-tender  Pelvic: deferred to OR  Skin: Normal texture         Diagnostics:    Imaging:  Uterus: 9 x 6 x 6cm  Endometrium with some fluid, endometrium thickness total 6mm  +submucosal fibroid   Multiple ~2cm subserosal and intramural fibroids  +left ovary with 1.5cm simple cyst     Data Review:        ASSESSMENT/PLAN:  Vane Oliveira is a 54 year old female who presents for postmenopausal bleeding found to have submucosal fibroid and endometrial thickness of 6mm       -- discussed risks of procedure including bleeding, infection, injury to surrounding structures. Written consents signed  - npo  - anesthesia to see  - antibiotics not indicated      All of the findings and plan were discussed with the patient.  She  notes understanding and agrees with the plan of care. She understands the risks and complications of the procedure; including but not limited to bleeding, infection, trauma to GI and  tracts.   All questions were answered.    Lynn Mckeon MD  12/13/2024  6:58 AM         [1]   Medications Prior to Admission   Medication Sig Dispense Refill Last Dose/Taking    levothyroxine 125 MCG Oral Tab Take 1 tablet (125 mcg total) by mouth once daily. 90 tablet 1 12/13/2024 Morning    Budesonide-Formoterol Fumarate 160-4.5 MCG/ACT Inhalation Aerosol Inhale 2 puffs into the lungs 2 (two) times daily.   12/12/2024 Noon    fluticasone-salmeterol 100-50 MCG/DOSE Inhalation Aerosol Powder, Breath Activated Inhale 1 puff into the lungs 2 (two) times daily.   12/12/2024 Noon    VENTOLIN  (90 Base) MCG/ACT Inhalation Aero Soln Inhale 2 puffs into the lungs every 4 (four) hours as needed for Wheezing. 1 Inhaler 1 Taking As Needed    albuterol sulfate (2.5 MG/3ML) 0.083% Inhalation Nebu Soln Take 3 mL (2.5 mg total) by nebulization every 4 (four) hours as needed for Wheezing. 1 Box 1 12/12/2024 Noon    Montelukast Sodium (SINGULAIR) 10 MG Oral Tab   1 12/12/2024 Evening    Multiple Vitamins-Minerals (MULTIVITAMIN OR) Take  by mouth.   Past Week    Ascorbic Acid (VITAMIN C OR) Take  by mouth.   Past Week    CALCIUM-VITAMIN D OR    Past Week    PEG 3350-KCl-Na Bicarb-NaCl (TRILYTE) 420 g Oral Recon Soln Starting at 4:00 pm the night before procedure, drink 8 ounces of the prep every 15-20 minutes until finished 1 Bottle 0    [2]   Allergies  Allergen Reactions    Seasonal ASTHMA, Coughing, ITCHING, Runny nose and WHEEZING     Itchy eyes

## 2024-12-13 NOTE — ANESTHESIA POSTPROCEDURE EVALUATION
[facility]    Vane Oliveira Patient Status:  Hospital Outpatient Surgery   Age/Gender 54 year old female MRN [id number]   Location [facility] SURGERY Attending Lynn Mckeon MD   Hosp Day # 0 PCP SHERLEY Iyer       Anesthesia Post-op Note    HYSTEROSCOPY MYOMECTOMY, DILATION AND CURETTAGE    Procedure Summary       Date: 12/13/24 Room / Location:  MAIN OR [address] [place] [address] MAIN OR    Anesthesia Start: 0738 Anesthesia Stop: 0805    Procedure: HYSTEROSCOPY MYOMECTOMY, DILATION AND CURETTAGE (Vagina ) Diagnosis: (SUBMUCOSAL FIBROID)    Surgeons: Lynn Mckeon MD Anesthesiologist: Maximiliano Claudio MD    Anesthesia Type: MAC ASA Status: 2            Anesthesia Type: MAC    Vitals Value Taken Time   /64 12/13/24 0810   Temp 98.4 °F (36.9 °C) 12/13/24 0810   Pulse 88 12/13/24 0810   Resp 15 12/13/24 0810   SpO2 96 % 12/13/24 0810       Patient Location: Same Day Surgery    Anesthesia Type: MAC    Airway Patency: patent    Postop Pain Control: adequate    Mental Status: mildly sedated but able to meaningfully participate in the post-anesthesia evaluation    Nausea/Vomiting: none    Cardiopulmonary/Hydration status: stable euvolemic    Complications: no apparent anesthesia related complications    Postop vital signs: stable    Comments: report given to RN. Pt breathing comfortably, VSS, following commands.     Dental Exam: Unchanged from Preop    Patient to be discharged from PACU when criteria met.

## (undated) DEVICE — HYSTEROSCOPIC OUTFLOW TUBE SET

## (undated) DEVICE — SLEEVE COMPR MD KNEE LEN SGL USE KENDALL SCD

## (undated) DEVICE — 2000CC GUARDIAN II: Brand: GUARDIAN

## (undated) DEVICE — MEDI-VAC NON-CONDUCTIVE SUCTION TUBING: Brand: CARDINAL HEALTH

## (undated) DEVICE — SOFT TISSUE SHAVER MINI: Brand: TRUCLEAR

## (undated) DEVICE — SOLUTION IRRIG 3000ML 0.9% NACL FLX CONT

## (undated) DEVICE — SOLUTION IRRIG 1000ML 0.9% NACL USP BTL

## (undated) DEVICE — PACK GYNE CUSTOM

## (undated) DEVICE — GLOVE SUR 6 SENSICARE PI PIP CRM PWD F

## (undated) DEVICE — PREMIUM WET SKIN PREP TRAY: Brand: MEDLINE INDUSTRIES, INC.

## (undated) DEVICE — SET TB INFLO FOR TRUCLEAR SYS HYSTEROLUX

## (undated) DEVICE — SYRINGE MED 10ML LL CTRL W/ FNGR GRP CLR BRL

## (undated) DEVICE — NEEDLE SPNL 22GA L3.5IN BLK QNCKE STYL DISP

## (undated) DEVICE — ELITE HYSTEROSCOPE SEAL: Brand: TRUCLEAR

## (undated) DEVICE — SPECIMEN SOCK - STANDARD: Brand: MEDI-VAC

## (undated) NOTE — Clinical Note
I had the pleasure of seeing Arthur Talbot on 9/16/2019. Please see my attached note. Narcisa Bains MD FACSEM[facility]--Surgery

## (undated) NOTE — LETTER
OUTSIDE TESTING RESULT REQUEST     IMPORTANT: FOR YOUR IMMEDIATE ATTENTION  Please FAX all test results listed below to: [phone]      * * * * If testing is NOT complete, arrange with patient A.S.A.P. * * * *      Patient Name: Vane Oliveira  Surgery Date: 2024  Medical Record: [id number]  CSN: [id number]  : 1970 - A: 54 y     Sex: female  Surgeon(s):  Lynn Mckeon MD  Procedure: HYSTEROSCOPY MYOMECTOMY, DILATION AND CURETTAGE  Anesthesia Type: MAC     Surgeon: Lynn Mckeon MD     The following Testing and Time Line are REQUIRED PER ANESTHESIA     CBC, Platelet; NO Differential within  30 days      Thank You,   Sent by: TEVIN Fragoso

## (undated) NOTE — LETTER
ASTHMA ACTION PLAN for [address] [place] [address]     : 1970     Date: 2017  Provider:  Melina Batista NP  Phone for doctor or clinic: [address] [place] [address] [facility]  [address] [phone]

## (undated) NOTE — Clinical Note
I had the pleasure of seeing Mil Sky on 10/23/2018. Please see my attached note. Trini Spain MD FAC[facility]--Surgery

## (undated) NOTE — Clinical Note
OUTSIDE TESTING RESULT REQUEST     IMPORTANT: FOR YOUR IMMEDIATE ATTENTION  Please FAX all test results listed below to: [phone]     Testing already done on or about: ***     * * * * If testing is NOT complete, arrange with patient A.S.A.P. * * * *      Patient Name: Vane Oliveira  Surgery Date: 2024  Medical Record: [id number]  CSN: [id number]  : 1970 - A: 54 y     Sex: female  Surgeon(s):  Lynn Mckeon MD  Procedure: HYSTEROSCOPY MYOMECTOMY, DILATION AND CURETTAGE  Anesthesia Type: MAC     Surgeon: Lynn Mckeon MD     The following Testing and Time Line are REQUIRED PER ANESTHESIA     {EDW PAT SENDOUT:2434}      Thank You,   Sent by:***

## (undated) NOTE — Clinical Note
I had the pleasure of seeing Maeve Christiansen on 10/10/2018. Please see my attached note. Lavell Perez MD [facility]--Surgery

## (undated) NOTE — LETTER
January 8, 2018    [address]      Dear Cy Block:    The following are the results of your recent tests. Please review the list of test results.   Your result is the value on the left; we have also supplied the TRIGLYCERIDES 79 <150 mg/dL   LDL-CHOLESTEROL 122 (H) mg/dL (calc)   CHOL/HDLC RATIO 3.9 <5.0 (calc)   NON-HDL CHOLESTEROL 140 (H) <130 mg/dL (calc)   -VITAMIN D, 25-HYDROXY   Result Value Ref Range   VITAMIN D, 25-OH, TOTAL 38 30 - 100 ng/mL       Your ch

## (undated) NOTE — LETTER
12/13/17        Kristine Oliveira  [address] [facility] [address]      Dear Pancho Sheets records indicate that you have outstanding lab work and or testing that was ordered for you and has not yet been completed:          CBC W/DIFF      COMP ME